# Patient Record
Sex: MALE | Race: WHITE | Employment: FULL TIME | URBAN - METROPOLITAN AREA
[De-identification: names, ages, dates, MRNs, and addresses within clinical notes are randomized per-mention and may not be internally consistent; named-entity substitution may affect disease eponyms.]

---

## 2017-01-12 ENCOUNTER — OFFICE VISIT (OUTPATIENT)
Dept: INTERNAL MEDICINE CLINIC | Facility: CLINIC | Age: 37
End: 2017-01-12

## 2017-01-12 VITALS
BODY MASS INDEX: 35.43 KG/M2 | HEIGHT: 75 IN | DIASTOLIC BLOOD PRESSURE: 71 MMHG | WEIGHT: 285 LBS | HEART RATE: 79 BPM | SYSTOLIC BLOOD PRESSURE: 120 MMHG

## 2017-01-12 DIAGNOSIS — E66.9 OBESITY (BMI 35.0-39.9 WITHOUT COMORBIDITY): ICD-10-CM

## 2017-01-12 DIAGNOSIS — G47.31 CENTRAL SLEEP DISORDERED BREATHING: ICD-10-CM

## 2017-01-12 DIAGNOSIS — R53.83 FATIGUE, UNSPECIFIED TYPE: ICD-10-CM

## 2017-01-12 DIAGNOSIS — R21 RASH AND OTHER NONSPECIFIC SKIN ERUPTION: ICD-10-CM

## 2017-01-12 DIAGNOSIS — Z76.89 ENCOUNTER TO ESTABLISH CARE: Primary | ICD-10-CM

## 2017-01-12 DIAGNOSIS — R06.83 SNORING: ICD-10-CM

## 2017-01-12 PROCEDURE — 99385 PREV VISIT NEW AGE 18-39: CPT | Performed by: INTERNAL MEDICINE

## 2017-01-12 RX ORDER — CLOTRIMAZOLE AND BETAMETHASONE DIPROPIONATE 10; .64 MG/G; MG/G
1 CREAM TOPICAL 2 TIMES DAILY
Qty: 45 G | Refills: 0 | Status: SHIPPED | OUTPATIENT
Start: 2017-01-12 | End: 2017-06-27 | Stop reason: ALTCHOICE

## 2017-01-13 NOTE — PROGRESS NOTES
HPI:    Patient ID: Issac Chapa is a 43year old male. HPI  Pt comes in for the first time to establish care and physical exam. Pt today complaint so rash over the groin and the legs and arms, itchy red , no pain.  Pt also been told by GF that he s Normal rate, regular rhythm, normal heart sounds and intact distal pulses. Pulmonary/Chest: Effort normal and breath sounds normal.   Abdominal: Soft. Bowel sounds are normal.   Musculoskeletal: Normal range of motion.    Neurological: He is alert and or

## 2017-06-27 ENCOUNTER — OFFICE VISIT (OUTPATIENT)
Dept: INTERNAL MEDICINE CLINIC | Facility: CLINIC | Age: 37
End: 2017-06-27

## 2017-06-27 VITALS
BODY MASS INDEX: 35.43 KG/M2 | DIASTOLIC BLOOD PRESSURE: 77 MMHG | TEMPERATURE: 98 F | SYSTOLIC BLOOD PRESSURE: 134 MMHG | WEIGHT: 285 LBS | HEART RATE: 75 BPM | HEIGHT: 75 IN

## 2017-06-27 DIAGNOSIS — H61.23 BILATERAL IMPACTED CERUMEN: Primary | ICD-10-CM

## 2017-06-27 DIAGNOSIS — H93.13 TINNITUS, BILATERAL: ICD-10-CM

## 2017-06-27 PROCEDURE — 69209 REMOVE IMPACTED EAR WAX UNI: CPT | Performed by: INTERNAL MEDICINE

## 2017-06-27 PROCEDURE — 99213 OFFICE O/P EST LOW 20 MIN: CPT | Performed by: INTERNAL MEDICINE

## 2017-06-27 PROCEDURE — 99212 OFFICE O/P EST SF 10 MIN: CPT | Performed by: INTERNAL MEDICINE

## 2017-06-27 NOTE — PATIENT INSTRUCTIONS
ASSESSMENT/PLAN:   Bilateral impacted cerumen  (primary encounter diagnosis)- wax was flushed on both ear, pt was able to hear right away  should use the debrox OTC once a month to prevent this in the future.    Tinnitus, bilateral- resolved,

## 2017-06-27 NOTE — PROGRESS NOTES
HPI:    Patient ID: Ayad Austin is a 43year old male. HPI  Pt comes in with complaint of bl ear ringing and difficulty hearing for few weeks, had tried over the counter debrox last week but it did not help.      Review of Systems   Constitutional: Neck: Normal range of motion. Neck supple. No thyromegaly present. Cardiovascular: Normal rate, regular rhythm, normal heart sounds and intact distal pulses. Exam reveals no friction rub. No murmur heard.   Pulmonary/Chest: Effort normal and breath

## 2018-08-16 ENCOUNTER — OFFICE VISIT (OUTPATIENT)
Dept: INTERNAL MEDICINE CLINIC | Facility: CLINIC | Age: 38
End: 2018-08-16
Payer: COMMERCIAL

## 2018-08-16 VITALS
BODY MASS INDEX: 35.43 KG/M2 | DIASTOLIC BLOOD PRESSURE: 71 MMHG | SYSTOLIC BLOOD PRESSURE: 114 MMHG | HEIGHT: 75 IN | WEIGHT: 285 LBS | HEART RATE: 61 BPM

## 2018-08-16 DIAGNOSIS — R21 RASH AND NONSPECIFIC SKIN ERUPTION: Primary | ICD-10-CM

## 2018-08-16 PROCEDURE — 99214 OFFICE O/P EST MOD 30 MIN: CPT | Performed by: INTERNAL MEDICINE

## 2018-08-16 RX ORDER — CLOTRIMAZOLE AND BETAMETHASONE DIPROPIONATE 10; .64 MG/G; MG/G
1 CREAM TOPICAL 2 TIMES DAILY PRN
Qty: 60 G | Refills: 1 | Status: SHIPPED | OUTPATIENT
Start: 2018-08-16 | End: 2019-08-26 | Stop reason: ALTCHOICE

## 2018-08-16 NOTE — PROGRESS NOTES
HPI:    Patient ID: Melisa Chilel is a 34year old male. HPI  Patient comes in today with complaint of rash to the left thigh close to the groin.   Patient was seen for the same thing about a year ago was given prescription of clotrimazole betamethas PHYSICAL EXAM:    Physical Exam   Constitutional: He is oriented to person, place, and time. He appears well-developed and well-nourished. HENT:   Head: Normocephalic and atraumatic.    Eyes: Conjunctivae and EOM are normal. Pupils are equal, round, a

## 2018-08-16 NOTE — PATIENT INSTRUCTIONS
ASSESSMENT/PLAN:   Rash and nonspecific skin eruption  (primary encounter diagnosis)  Will treat with the combination steroid antifungal cream also refer to dermatology to rule out psoriasis  Patient will also come in for annual physical exam

## 2025-04-08 ENCOUNTER — APPOINTMENT (OUTPATIENT)
Dept: CT IMAGING | Facility: HOSPITAL | Age: 45
End: 2025-04-08
Attending: EMERGENCY MEDICINE
Payer: COMMERCIAL

## 2025-04-08 ENCOUNTER — HOSPITAL ENCOUNTER (EMERGENCY)
Facility: HOSPITAL | Age: 45
Discharge: HOME | End: 2025-04-08
Attending: EMERGENCY MEDICINE
Payer: COMMERCIAL

## 2025-04-08 VITALS
DIASTOLIC BLOOD PRESSURE: 80 MMHG | TEMPERATURE: 98 F | SYSTOLIC BLOOD PRESSURE: 117 MMHG | HEART RATE: 81 BPM | RESPIRATION RATE: 12 BRPM | OXYGEN SATURATION: 98 %

## 2025-04-08 DIAGNOSIS — G44.009 CLUSTER HEADACHE, NOT INTRACTABLE, UNSPECIFIED CHRONICITY PATTERN: ICD-10-CM

## 2025-04-08 DIAGNOSIS — R73.9 HYPERGLYCEMIA: Primary | ICD-10-CM

## 2025-04-08 LAB
ANION GAP SERPL CALC-SCNC: 9 MMOL/L (ref 0–18)
BASOPHILS # BLD AUTO: 0.02 X10(3) UL (ref 0–0.2)
BASOPHILS NFR BLD AUTO: 0.3 %
BUN BLD-MCNC: 12 MG/DL (ref 9–23)
BUN/CREAT SERPL: 11.8 (ref 10–20)
CALCIUM BLD-MCNC: 9.5 MG/DL (ref 8.7–10.4)
CHLORIDE SERPL-SCNC: 100 MMOL/L (ref 98–112)
CO2 SERPL-SCNC: 26 MMOL/L (ref 21–32)
CREAT BLD-MCNC: 1.02 MG/DL
DEPRECATED RDW RBC AUTO: 35.2 FL (ref 35.1–46.3)
EGFRCR SERPLBLD CKD-EPI 2021: 92 ML/MIN/1.73M2 (ref 60–?)
EOSINOPHIL # BLD AUTO: 0.02 X10(3) UL (ref 0–0.7)
EOSINOPHIL NFR BLD AUTO: 0.3 %
ERYTHROCYTE [DISTWIDTH] IN BLOOD BY AUTOMATED COUNT: 11.4 % (ref 11–15)
ERYTHROCYTE [SEDIMENTATION RATE] IN BLOOD: 25 MM/HR
GLUCOSE BLD-MCNC: 279 MG/DL (ref 70–99)
GLUCOSE BLDC GLUCOMTR-MCNC: 273 MG/DL (ref 70–99)
HCT VFR BLD AUTO: 44.9 %
HGB BLD-MCNC: 16.3 G/DL
IMM GRANULOCYTES # BLD AUTO: 0.03 X10(3) UL (ref 0–1)
IMM GRANULOCYTES NFR BLD: 0.4 %
LYMPHOCYTES # BLD AUTO: 0.71 X10(3) UL (ref 1–4)
LYMPHOCYTES NFR BLD AUTO: 9.6 %
MCH RBC QN AUTO: 31.1 PG (ref 26–34)
MCHC RBC AUTO-ENTMCNC: 36.3 G/DL (ref 31–37)
MCV RBC AUTO: 85.7 FL
MONOCYTES # BLD AUTO: 0.61 X10(3) UL (ref 0.1–1)
MONOCYTES NFR BLD AUTO: 8.3 %
NEUTROPHILS # BLD AUTO: 5.98 X10 (3) UL (ref 1.5–7.7)
NEUTROPHILS # BLD AUTO: 5.98 X10(3) UL (ref 1.5–7.7)
NEUTROPHILS NFR BLD AUTO: 81.1 %
OSMOLALITY SERPL CALC.SUM OF ELEC: 290 MOSM/KG (ref 275–295)
PLATELET # BLD AUTO: 223 10(3)UL (ref 150–450)
POTASSIUM SERPL-SCNC: 3.9 MMOL/L (ref 3.5–5.1)
RBC # BLD AUTO: 5.24 X10(6)UL
SODIUM SERPL-SCNC: 135 MMOL/L (ref 136–145)
TROPONIN I SERPL HS-MCNC: <3 NG/L
WBC # BLD AUTO: 7.4 X10(3) UL (ref 4–11)

## 2025-04-08 PROCEDURE — 93010 ELECTROCARDIOGRAM REPORT: CPT

## 2025-04-08 PROCEDURE — 99284 EMERGENCY DEPT VISIT MOD MDM: CPT

## 2025-04-08 PROCEDURE — 85025 COMPLETE CBC W/AUTO DIFF WBC: CPT | Performed by: EMERGENCY MEDICINE

## 2025-04-08 PROCEDURE — 93005 ELECTROCARDIOGRAM TRACING: CPT

## 2025-04-08 PROCEDURE — 84484 ASSAY OF TROPONIN QUANT: CPT | Performed by: EMERGENCY MEDICINE

## 2025-04-08 PROCEDURE — 82962 GLUCOSE BLOOD TEST: CPT

## 2025-04-08 PROCEDURE — 85652 RBC SED RATE AUTOMATED: CPT | Performed by: EMERGENCY MEDICINE

## 2025-04-08 PROCEDURE — 70450 CT HEAD/BRAIN W/O DYE: CPT | Performed by: EMERGENCY MEDICINE

## 2025-04-08 PROCEDURE — 36415 COLL VENOUS BLD VENIPUNCTURE: CPT

## 2025-04-08 PROCEDURE — 80048 BASIC METABOLIC PNL TOTAL CA: CPT | Performed by: EMERGENCY MEDICINE

## 2025-04-09 LAB
ATRIAL RATE: 88 BPM
P AXIS: 57 DEGREES
P-R INTERVAL: 154 MS
Q-T INTERVAL: 350 MS
QRS DURATION: 80 MS
QTC CALCULATION (BEZET): 423 MS
R AXIS: 28 DEGREES
T AXIS: 19 DEGREES
VENTRICULAR RATE: 88 BPM

## 2025-04-09 NOTE — ED PROVIDER NOTES
Patient Seen in: [facility] Emergency Department      History     Chief Complaint   Patient presents with    Headache     Stated Complaint: Migraine    Subjective:   HPI    Pt is 45 yo M who p/w pressure behind left eye x 2 months. States it comes a couple times a week and notices it more when driving. He gets dizzy with the episodes and sometimes tears up. He has tried amoxicillin, prednisone and allergy medications. Denies sinus congestion or fevers. Denies blurry vision. Denies ringing in ears, vomiting, chest pain or trouble breathing. Has seen ENT and ophtho. Happened again tonight when driving immediately pta.   Denies pertinent family history  Denies drug use    Objective:     Past Medical History:    Plantar fasciitis    right foot    SEASONAL ALLERGIES              History reviewed. No pertinent surgical history.             Social History     Socioeconomic History    Marital status: Single    Number of children: 0   Tobacco Use    Smoking status: Never    Smokeless tobacco: Never   Substance and Sexual Activity    Alcohol use: Yes     Comment: socially    Drug use: No   Other Topics Concern     Service No    Blood Transfusions No    Caffeine Concern Yes     Comment: coffee, soda, energy drinks    Occupational Exposure No    Hobby Hazards No    Sleep Concern Yes    Stress Concern No    Weight Concern Yes    Special Diet No    Back Care No    Exercise No    Bike Helmet No    Seat Belt Yes    Self-Exams Yes                  Physical Exam     ED Triage Vitals [04/08/25 1909]   /77   Pulse 84   Resp 21   Temp 98.1 °F (36.7 °C)   Temp src    SpO2 99 %   O2 Device None (Room air)       Current Vitals:   Vital Signs  BP: 128/88  Pulse: 80  Resp: 19  Temp: 98.1 °F (36.7 °C)  MAP (mmHg): 96    Oxygen Therapy  SpO2: 99 %  O2 Device: None (Room air)        Physical Exam  GENERAL: No acute distress, awake and alert, moderately anxious  HEENT: EOMI, PERRL, conjunctiva normal  Neck: supple, no jvd.  +superficial lipoma left side of neck, no meningeal signs  CV: RRR, no murmurs  Resp: CTAB, no wheezes or retractions  Extremities: FROM of all extremities  Neuro: CN intact, normal speech, normal gait, 5/5 motor strength in all extremities, no focal deficits  SKIN: warm, dry, no rashes      ED Course     Labs Reviewed   BASIC METABOLIC PANEL (8) - Abnormal; Notable for the following components:       Result Value    Glucose 279 (*)     Sodium 135 (*)     All other components within normal limits   CBC WITH DIFFERENTIAL WITH PLATELET - Abnormal; Notable for the following components:    Lymphocyte Absolute 0.71 (*)     All other components within normal limits   SED RATE, WESTERGREN (AUTOMATED) - Abnormal; Notable for the following components:    Sed Rate 25 (*)     All other components within normal limits   POCT GLUCOSE - Abnormal; Notable for the following components:    POC Glucose  273 (*)     All other components within normal limits   TROPONIN I HIGH SENSITIVITY - Normal        MDM      Medical Decision Making  Ddx: anxiety, orthostatic hypotension, arrhythmia, cluster headaches, electrolyte abnormality  Pt placed on nasal cannula O2 to help with pressure for possible cluster headaches.   Orthostatics normal  Labs reassuring    Reexam: vitals stable. Oxygen improved pressure in head. Notified of elevated glucose and close f/u. We discussed starting metformin as he has had elevated glucoses recently. Advised on return precuations.     Amount and/or Complexity of Data Reviewed  External Data Reviewed: notes.     Details: Ophtho notes 2025 reviewed  Labs: ordered.  Radiology: ordered.  ECG/medicine tests: ordered and independent interpretation performed.     Details: NSR rate 88 no LEIGHANN, normal QT    Risk  Prescription drug management.        Disposition and Plan     Clinical Impression:  1. Hyperglycemia    2. Cluster headache, not intractable, unspecified chronicity pattern          Disposition:  Discharge  4/8/2025  8:58 pm    Follow-up:  Austin Lees DO  [address] [facility] [address]  [facility] [id number] [phone]    Follow up      Dharmesh Jon DO  [address]  [facility] [address] [phone]    Follow up      Dharmesh Jon DO  [address]  [facility] [address] [phone]    Follow up            Medications Prescribed:  Current Discharge Medication List        START taking these medications    Details   metFORMIN 500 MG Oral Tab Take 1 tablet (500 mg total) by mouth 2 (two) times daily with meals.  Qty: 60 tablet, Refills: 0                 Supplementary Documentation:

## 2025-04-09 NOTE — ED INITIAL ASSESSMENT (HPI)
Pt to ed w/c of pressure on the left side of the forehead, above eyebrow. Pt denies dizziness/lightheadedness. States the pain can feel like \"If I'm going to pass out\". Pt reports taking two new medications but head feeling has been present since before. Pt reports following up with ENT and eye doctor and was told everything was normal. Pt also reports a lympoma on left side of neck.

## 2025-06-25 ENCOUNTER — OFFICE VISIT (OUTPATIENT)
Dept: ENDOCRINOLOGY CLINIC | Facility: CLINIC | Age: 45
End: 2025-06-25

## 2025-06-25 VITALS
HEART RATE: 66 BPM | BODY MASS INDEX: 33.49 KG/M2 | RESPIRATION RATE: 18 BRPM | WEIGHT: 269.38 LBS | HEIGHT: 75 IN | SYSTOLIC BLOOD PRESSURE: 115 MMHG | DIASTOLIC BLOOD PRESSURE: 71 MMHG

## 2025-06-25 DIAGNOSIS — E11.9 TYPE 2 DIABETES MELLITUS WITHOUT COMPLICATION, WITHOUT LONG-TERM CURRENT USE OF INSULIN (HCC): Primary | ICD-10-CM

## 2025-06-25 LAB
GLUCOSE BLOOD: 100
HEMOGLOBIN A1C: 5.2 % (ref 4.3–5.6)
TEST STRIP LOT #: NORMAL NUMERIC

## 2025-06-25 PROCEDURE — 3044F HG A1C LEVEL LT 7.0%: CPT | Performed by: NURSE PRACTITIONER

## 2025-06-25 PROCEDURE — 99203 OFFICE O/P NEW LOW 30 MIN: CPT | Performed by: NURSE PRACTITIONER

## 2025-06-25 PROCEDURE — 82947 ASSAY GLUCOSE BLOOD QUANT: CPT | Performed by: NURSE PRACTITIONER

## 2025-06-25 PROCEDURE — 83036 HEMOGLOBIN GLYCOSYLATED A1C: CPT | Performed by: NURSE PRACTITIONER

## 2025-06-25 PROCEDURE — 3008F BODY MASS INDEX DOCD: CPT | Performed by: NURSE PRACTITIONER

## 2025-06-25 PROCEDURE — 3074F SYST BP LT 130 MM HG: CPT | Performed by: NURSE PRACTITIONER

## 2025-06-25 PROCEDURE — 3078F DIAST BP <80 MM HG: CPT | Performed by: NURSE PRACTITIONER

## 2025-06-25 NOTE — PATIENT INSTRUCTIONS
A1C: 5.2% today --> decreased from 9.2% on 4/9/2025.   Blood glucose: 100 in clinic today    Medications:   - continue with Metformin 500mg before dinner     - continue to follow a low carbohydrate diet  - increase walking to 4-5x weekly     Weight:  Wt Readings from Last 6 Encounters:   06/25/25 269 lb 6.4 oz (122.2 kg)   03/03/21 (!) 310 lb (140.6 kg)   02/13/20 (!) 305 lb 4.8 oz (138.5 kg)   11/18/19 (!) 301 lb 3.2 oz (136.6 kg)   08/26/19 (!) 302 lb (137 kg)   08/16/18 285 lb (129.3 kg)     A1C goal:  <6.5%    Blood sugar testing:  Test your blood sugar 1 time daily   Recommended times to test: alternate  Before breakfast (fasting) or 2hrs after meals     Blood sugar targets:  Before breakfast: )  Before meals: <150   2 hours after meals: <150  Call for persistent blood sugars < 75 or > 200

## 2025-06-25 NOTE — PROGRESS NOTES
Name: Catrachito Faye  Date: 6/25/2025    Referring Physician: No ref. provider found    CHIEF COMPLAINT   Chief Complaint   Patient presents with    Diabetes     Consult     HISTORY OF PRESENT ILLNESS   Catrachito Faye is a 44 year old male who presents for new consultation on diabetes management. Patient is accompanied by sister today.   HbA1C: 5.2% at POC today. Previously was 9.2% on 4/9/2025.    Blood glucose is: 100 in clinic today.     FAMILY HISTORY OF DIABETES  - sister had GDM but no diabetes currently; no other family members   DIABETES HISTORY  Diagnosed: 4/1/2025  Prior HbA, C or glycohemoglobin were 9.2% 4/9/2025; 5.2% at POC today;     Patient has not had hospitalizations for blood sugar issues   Denies any history of pancreatitis.     PREVIOUS MEDICATION FOR DM:  None     CURRENT MEDICATIONS FOR DM:  Metformin 500mg with dinner     HOME GLUCOSE READINGS:   Testing BG x  1 every other days   Meter or log book today: no  After dinner: 120s   does not check any other time of the day   Hypoglycemia present: no     HISTORY OF DIABETES COMPLICATIONS:  History of Retinopathy:  - last eye exam within the last 12 months: yes - followed by Dr. Pulido  History of Neuropathy: denies   History of Nephropathy: denies     ASSOCIATED COMPLICATIONS:   HTN: denies   Hyperlipidemia: denies   Cardiovascular Disease: denies  Peripheral Vascular Disease: denies     DIETARY COMPLIANCE:  Good; following low carb diet consistently   Avoids soda and alcohol; fried foods    EXERCISE:   Walking 3-4 weekly around 1 mile  - very active at work     Polyuria, polyphagia, polydipsia: denies   Paresthesias: denies   Blurred vision: denies  Recent steroids, illness or infections: denies    REVIEW OF SYSTEMS  Constitutional: Negative for: weight change, fever, fatigue, cold/heat intolerance  Eyes: Negative for:  Visual changes, proptosis, blurring  ENT: Negative for:  dysphagia, neck swelling, dysphonia  Respiratory: Negative for:  hemoptysis, shortness of breath, cough, or dyspnea.  Cardiovascular: Negative for:  chest pain, chest discomfort, palpitations  GI: Negative for:  abdominal pain, nausea, vomiting, diarrhea, heartburn, constipation  Neurology: Negative for: headache, dizziness, syncope, numbness/tingling, or weakness.   Genito-Urinary: Negative for: dysuria, frequency or hematuria   Hematology/Lymphatics: Negative for: bruising, easy bleeding, lower extremity edema  Skin: Negative for: rash, blister, infection or ulcers.  Endocrine: Negative for: polyuria, polydipsia. no osteoporosis. no thyroid disease.     MEDICATIONS:   Medications - Current[1]    ALLERGIES:   Allergies[2]    SOCIAL HISTORY:   Social Hx on file[3]    PAST MEDICAL HISTORY:   Past Medical History[4]    PAST SURGICAL HISTORY:   Past Surgical History[5]    PHYSICAL EXAM:   Vitals:    06/25/25 0928   BP: 115/71   BP Location: Right arm   Pulse: 66   Resp: 18   Weight: 269 lb 6.4 oz (122.2 kg)   Height: 6' 3\" (1.905 m)     BMI:   Body mass index is 33.67 kg/m².    General Appearance:  alert, well developed, in no acute distress  Nutritional:  no extreme weight gain or loss  Head: Atraumatic  Eyes:  normal conjunctivae, sclera., normal sclera and normal pupils  Throat/Neck: normal sound to voice. Normal hearing, normal speech  Back: no kyphosis  Respiratory:  Speaking in full sentences, non-labored. no increased work of breathing, no audible wheezing    Skin:  normal moisture and skin texture, no visible lesions  Hair and nails: normal scalp hair  Hematologic:  no excessive bruising  Neuro: motor grossly intact, moving all extremities without difficulty  Psychiatric:  oriented to time, self, and place  Extremities: no obvious extremity swelling, no lesions    Diabetes Foot Exam:  Bilateral barefoot skin diabetic exam is abnormal with pulsation pedal pulse exam abnormal  Bilateral barefoot skin diabetic exam is normal, visualized feet and the appearance is  normal.  Bilateral monofilament/sensation of both feet is normal.      LABS: Pertinent labs reviewed    ASSESSMENT/PLAN:    -Reviewed with patient the pathogenesis of diabetes, clinical significance of A1c, and common complications such as: microvascular, macrovascular and diabetic ketoacidosis. Patient verbalizes understanding of the importance of glycemic control and the goals of therapy.   -Discussed with patient glucose targets ranges (Fasting  and post prandial <180).     1.Type 2 Diabetes Mellitus, controlled  -LAB DATA  HbA1C: 5.2% today   a) Medications  - continue with Metformin 500mg before dinner    - congratulated patient on improved glucose reading and weight loss.   -continue with limiting carbs to 30-45gm per meal/ max 135gm per day.   -instructed to increase walking to 4-5x weekly   -reviewed target goal BG readings and A1C  -reviewed when to call and notify me of abnormal BG readings.   - reviewed symptoms of hypoglycemia and hyperglycemia in detail today     b) Nephropathy: GFR: 110.1 on 2025  and urine MA: on   c) UTD with optho- followed by Dr. Pulido  d) Foot exam: abnormal today 2025 --> discussed that he follows up with PCP regarding this   e) start testing BG Reading 1 daily - alternate testing with fasting or 2hrs after meals   f) Life style changes reviewed    2. Blood Pressure Management   -normotensive today     3.Hyperlipidemia   -LDL: 122 and Tri on 2025  - continue with lifestyle modifications of reducing dietary total and saturated fat, weight loss, aerobic exercise, and eating a diet rich in fruits and vegetables.  - will repeat lipid panel again at next f/u visit and address statin therapy       RTC in 4 months   Patient instructed to call sooner if they develop Blood glucose readings <75 and/or if they have readings persistently >200.     The risks and benefits of my recommendations were discussed with the patient today. questions were also answered to the  best of my knowledge. Patient verbalizes understanding of these issues and agrees to the plan.    6/25/2025  MEAGAN Osorio       [1]   Current Outpatient Medications:     Cholecalciferol (VITAMIN D3) 1.25 MG (29605 UT) Oral Cap, Take 1 tablet by mouth once a week., Disp: 16 capsule, Rfl: 0    Clobetasol Prop Emollient Base (CLOBETASOL PROPIONATE E) 0.05 % External Cream, Apply 1 Application topically 2 (two) times daily., Disp: 60 g, Rfl: 0    MONTELUKAST SODIUM 10 MG Oral Tab, Take 1 tablet (10 mg total) by mouth nightly., Disp: 90 tablet, Rfl: 0  [2] No Known Allergies  [3]   Social History  Socioeconomic History    Marital status: Single    Number of children: 0   Tobacco Use    Smoking status: Never    Smokeless tobacco: Never   Substance and Sexual Activity    Alcohol use: Yes     Comment: socially    Drug use: No   Other Topics Concern     Service No    Blood Transfusions No    Caffeine Concern Yes     Comment: coffee, soda, energy drinks    Occupational Exposure No    Hobby Hazards No    Sleep Concern Yes    Stress Concern No    Weight Concern Yes    Special Diet No    Back Care No    Exercise No    Bike Helmet No    Seat Belt Yes    Self-Exams Yes   [4]   Past Medical History:   Plantar fasciitis    right foot    SEASONAL ALLERGIES   [5] No past surgical history on file.

## 2025-07-31 ENCOUNTER — TELEPHONE (OUTPATIENT)
Dept: ENDOCRINOLOGY CLINIC | Facility: CLINIC | Age: 45
End: 2025-07-31

## 2025-07-31 DIAGNOSIS — E11.9 TYPE 2 DIABETES MELLITUS WITHOUT COMPLICATION, WITHOUT LONG-TERM CURRENT USE OF INSULIN (HCC): Primary | ICD-10-CM

## (undated) NOTE — MR AVS SNAPSHOT
[address] [facility] [address]  [phone]               Thank you for choosing us for your health care visit with Heidi Borja MD.  We are glad to serve you and happy to provide you with this summary of your visit. clotrimazole-betamethasone 1-0.05 % Crea   Apply 1 Application topically 2 (two) times daily.    Commonly known as:  LOTRISONE                Where to Get Your Medications      These medications were sent to [facility] [R53.83], [facility] [G47.31]                 Referral Details     Referred By    Referred To    Darci Desai MD   [address]   Phone:  [phone]   Fax:  [phone]    Diagnoses:  [employer] will allow you to access patient instructions from your recent visit,  view other health information, and more. To sign up or find more information, go to [email or web address] and click on the Sign Up Now link in the [employer] box.      Enter 2 ½ hours per week – spread out over time Use a vera to keep you motivated   Don’t forget strength training with weights and resistance Set goals and track your progress   You don’t need to join a gym. Home exercises work great.  Put more priority on exe